# Patient Record
Sex: MALE | Race: WHITE
[De-identification: names, ages, dates, MRNs, and addresses within clinical notes are randomized per-mention and may not be internally consistent; named-entity substitution may affect disease eponyms.]

---

## 2019-02-12 ENCOUNTER — OTHER (OUTPATIENT)
Age: 24
End: 2019-02-12

## 2019-02-14 ENCOUNTER — APPOINTMENT (OUTPATIENT)
Dept: INTERNAL MEDICINE | Facility: CLINIC | Age: 24
End: 2019-02-14
Payer: COMMERCIAL

## 2019-02-14 ENCOUNTER — LABORATORY RESULT (OUTPATIENT)
Age: 24
End: 2019-02-14

## 2019-02-14 VITALS
HEIGHT: 63 IN | WEIGHT: 158 LBS | SYSTOLIC BLOOD PRESSURE: 132 MMHG | BODY MASS INDEX: 28 KG/M2 | OXYGEN SATURATION: 98 % | HEART RATE: 102 BPM | DIASTOLIC BLOOD PRESSURE: 70 MMHG

## 2019-02-14 DIAGNOSIS — Z83.3 FAMILY HISTORY OF DIABETES MELLITUS: ICD-10-CM

## 2019-02-14 DIAGNOSIS — G43.909 MIGRAINE, UNSPECIFIED, NOT INTRACTABLE, W/OUT STATUS MIGRAINOSUS: ICD-10-CM

## 2019-02-14 PROCEDURE — G0442 ANNUAL ALCOHOL SCREEN 15 MIN: CPT

## 2019-02-14 PROCEDURE — 99385 PREV VISIT NEW AGE 18-39: CPT | Mod: 25

## 2019-02-14 PROCEDURE — G0444 DEPRESSION SCREEN ANNUAL: CPT

## 2019-02-18 PROBLEM — Z83.3 FAMILY HISTORY OF DIABETES MELLITUS: Status: ACTIVE | Noted: 2019-02-18

## 2019-02-18 PROBLEM — G43.909 MIGRAINE HEADACHE: Status: ACTIVE | Noted: 2019-02-18

## 2019-02-18 RX ORDER — LEUPROLIDE ACETATE 11.25 MG
KIT INTRAMUSCULAR
Refills: 0 | Status: ACTIVE | COMMUNITY

## 2019-02-18 RX ORDER — TESTOSTERONE CYPIONATE 100 MG/ML
100 INJECTION, SOLUTION INTRAMUSCULAR
Refills: 0 | Status: ACTIVE | COMMUNITY

## 2019-02-18 NOTE — ASSESSMENT
[FreeTextEntry1] : 1.  [facility] - reports Tdap is UTD.  Had a flu shot last month.  No menses since he started the testosterone.  Reminded that he will need to follow acceptible screening intervals.\par 2.  FTM - referred to Endocrine for HRT.\par 3.  Labs as per plan, including STI's.\par 4.  Elevated LFT's - reports that he has not had them checked in 2 years.  Will check today along with hepatitis serologies.\par 5.  Further management pending above.

## 2019-02-18 NOTE — HISTORY OF PRESENT ILLNESS
[de-identified] : The patient is a 25 yo MTF trasngender male who presents to the office today to establish care with a PCP and to continue with his HRT through endocrinology.  He is currently on testosterone injections and lupron.  He came out at the age of 20 and started testosterone in 9/17.  He underwent top surgery in 9/18 and he is pleased with the outcome.  He reports good ROM..  He has a good support system at home and they have been accepting.  He has an upcoming appointment with [facility].  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  He tells me that he has a h/o elevated LFT's that was probably related to the use of tylenoll #3 a number of years in the past.  he was followed by an MD for this and tells me that the LFT's have trended down since he stopped taking the tylenol in 2015.

## 2019-02-20 ENCOUNTER — APPOINTMENT (OUTPATIENT)
Dept: PEDIATRIC ALLERGY IMMUNOLOGY | Facility: CLINIC | Age: 24
End: 2019-02-20

## 2019-02-22 ENCOUNTER — OTHER (OUTPATIENT)
Age: 24
End: 2019-02-22

## 2019-02-25 ENCOUNTER — OTHER (OUTPATIENT)
Age: 24
End: 2019-02-25

## 2019-02-26 ENCOUNTER — OTHER (OUTPATIENT)
Age: 24
End: 2019-02-26

## 2019-03-21 ENCOUNTER — OTHER (OUTPATIENT)
Age: 24
End: 2019-03-21

## 2019-03-22 ENCOUNTER — APPOINTMENT (OUTPATIENT)
Dept: PEDIATRIC ALLERGY IMMUNOLOGY | Facility: CLINIC | Age: 24
End: 2019-03-22
Payer: COMMERCIAL

## 2019-03-22 ENCOUNTER — OUTPATIENT (OUTPATIENT)
Dept: OUTPATIENT SERVICES | Facility: HOSPITAL | Age: 24
LOS: 1 days | End: 2019-03-22
Payer: COMMERCIAL

## 2019-03-22 ENCOUNTER — OTHER (OUTPATIENT)
Age: 24
End: 2019-03-22

## 2019-03-22 VITALS
HEIGHT: 63 IN | SYSTOLIC BLOOD PRESSURE: 121 MMHG | WEIGHT: 155 LBS | BODY MASS INDEX: 27.46 KG/M2 | OXYGEN SATURATION: 98 % | HEART RATE: 71 BPM | DIASTOLIC BLOOD PRESSURE: 80 MMHG

## 2019-03-22 DIAGNOSIS — Z00.00 ENCOUNTER FOR GENERAL ADULT MEDICAL EXAMINATION W/OUT ABNORMAL FINDINGS: ICD-10-CM

## 2019-03-22 PROCEDURE — 99204 OFFICE O/P NEW MOD 45 MIN: CPT

## 2019-03-22 PROCEDURE — G0463: CPT

## 2019-03-22 NOTE — SOCIAL HISTORY
[Sexually Active] : The patient is sexually active [Monogamous] : monogamous [Vaginal] : vaginal [Oral-Receptive (pt. mouth)] : oral-receptive (pt. mouth) [To Pt Genitalia] : pt genitalia [Both ___] : [unfilled] male and female [Partnership for Health – Safe Sex Evidence Based Intervention] : The Partnership for Health Safe Sex Evidence Based Intervention was applied [Positive Reinforcement of Safe Behavior Message] : and a positive reinforcement of safe behavior message was provided.

## 2019-03-22 NOTE — HISTORY OF PRESENT ILLNESS
[FreeTextEntry1] : Call PT: Boy\par \par Sexual orientation: Garcia\par  \par Reason for engagement: PT is a 24 year old assigned Female at birth, ID Male, He/Him pronouns coming in for an intake visit at [facility]. \par  \par Concerns: PT is concerned that he is low on his testosterone prescription. \par  \par Goals: \par •PT would like to transfer endocrinology care. PT currently goes to [facility].	 \par •PT would also like to connect with a therapist to cope with anxiety.	 	 \par  \par Transition HX & Present Life: PT reports always knowing "something was off" from a very young age. PT said when he was 11 and 12 he went on YouTube and watched transition videos. PT reports showing boyfriend the transition videos at the time, and thinking that although he was interested in transitioning, it would never be something he could do. PT reports mid-2017 he "couldn't take it anymore" and came out to family and friends. By September 2017, PT started hormones, changed name, and got top-surgery. 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 PT served for a few months. PT states that his anxiety triggers depression at times. PT is interested in connecting with a mental health provider to address issues with anxiety. No SI/HI currently. \par \par Endocrinology: PT's previous endocrinologist was concerned that PT may be insensitive to androgens. Before going to [facility] and seeing Geovani Faulkner, patient went to [facility]. PT felt uncomfortable there and he wanted to see a specialist. PT would like to be tested and monitored with an endocrinologist.\par  \par Medications: PT takes Testosterone injection (0.3 mL weekly)\par  \par Causes of Gender Dysphoria/Body Dysmorphia: PT has gender dysphoria about thighs, but states that it is not "crippling anymore". PT said that since top-surgery, he has been feeling a lot better and less dysphoric about his body. PT is also frustrated and dysphoric about lack of leg hair. \par  \par Coping Strategies: PT says that when he feels dysphoric he likes to look at pictures of himself from years ago before he began Testosterone and got surgery and it makes him feel better. PT likes to make comparison photos. PT also talks to boyfriend who is a transmale. \par  \par Appearance: PT presents as male with short hair cut and facial hair. PT has had top surgery and no longer binds. PT cut hair short in 2016.\par  \par Tattoos/Piercings: PT has 13 tattoos all done professionally with sterile technique. PT does not have any piercings.\par  \par Cigarette and/or marijuana use? PT smokes cigarettes occasionally in social settings. Less than monthly. \par Alcohol use? PT drinks very occasionally and has not drank in a year.\par Drug use? PT denies any drug use.\par  \par Primary Care Provider: Dr. Elida Bender.\par  \par OB/GYN: Patient reports he has been avoiding going to a gynecologist. \par  \par Reproductive Endocrinology: PT did not pursue egg freezing and has no interest in having biological kids.\par  \par Gender-Affirming Surgery: PT got top-surgery from Dr. Lindsey Alfonso in September 2018 ([facility]). PT is very happy with the results.\par  \par Name Change/Gender Marker: PT changed name and gender marker in November 2017. PT was born in [place] and has had a hard time changing birth certificate. PT has not been successful in changing birth certificate. \par  \par Nutrition: PT is not interested in seeing nutritionist.\par  \par Sexual Health: PT is sexually active in a monogamous relationship of 12 years. PT engages in kissing, touching, oral sex, and vaginal sex with transmale partner. \par \par \par

## 2019-03-22 NOTE — PHYSICAL EXAM
[Alert] : alert [Well Nourished] : well nourished [Healthy Appearance] : healthy appearance [No Acute Distress] : no acute distress [Well Developed] : well developed [Normal Pupil & Iris Size/Symmetry] : normal pupil and iris size and symmetry [No Discharge] : no discharge [No Photophobia] : no photophobia [Sclera Not Icteric] : sclera not icteric [Normal TMs] : both tympanic membranes were normal [Normal Nasal Mucosa] : the nasal mucosa was normal [Normal Lips/Tongue] : the lips and tongue were normal [Normal Outer Ear/Nose] : the ears and nose were normal in appearance [Normal Tonsils] : normal tonsils [No Thrush] : no thrush [Normal Dentition] : normal dentition [No Oral Lesions or Ulcers] : no oral lesions or ulcers [Supple] : the neck was supple [Normal Rate and Effort] : normal respiratory rhythm and effort [Normal Palpation] : palpation of the chest revealed no abnormalities [No Crackles] : no crackles [No Retractions] : no retractions [Bilateral Audible Breath Sounds] : bilateral audible breath sounds [Normal Rate] : heart rate was normal  [Normal S1, S2] : normal S1 and S2 [No murmur] : no murmur [Regular Rhythm] : with a regular rhythm [Soft] : abdomen soft [Not Tender] : non-tender [Not Distended] : not distended [No HSM] : no hepato-splenomegaly [Normal Cervical Lymph Nodes] : cervical [Skin Intact] : skin intact  [No Rash] : no rash [No Joint Swelling or Erythema] : no joint swelling or erythema [No clubbing] : no clubbing [No Edema] : no edema [No Cyanosis] : no cyanosis [Normal Mood] : mood was normal [Normal Affect] : affect was normal [Alert, Awake, Oriented as Age-Appropriate] : alert, awake, oriented as age appropriate [de-identified] : no palpable supraclavicular LAD.

## 2019-03-26 DIAGNOSIS — F64.0 TRANSSEXUALISM: ICD-10-CM

## 2019-03-26 DIAGNOSIS — Z00.00 ENCOUNTER FOR GENERAL ADULT MEDICAL EXAMINATION WITHOUT ABNORMAL FINDINGS: ICD-10-CM

## 2019-04-01 ENCOUNTER — OTHER (OUTPATIENT)
Age: 24
End: 2019-04-01

## 2019-04-07 LAB
25(OH)D3 SERPL-MCNC: 8.9 NG/ML
ALBUMIN SERPL ELPH-MCNC: 4.5 G/DL
ALP BLD-CCNC: 216 U/L
ALT SERPL-CCNC: 52 U/L
ANION GAP SERPL CALC-SCNC: 11 MMOL/L
AST SERPL-CCNC: 54 U/L
BASOPHILS # BLD AUTO: 0.01 K/UL
BASOPHILS NFR BLD AUTO: 0.2 %
BILIRUB SERPL-MCNC: 1.2 MG/DL
BUN SERPL-MCNC: 16 MG/DL
C TRACH RRNA SPEC QL NAA+PROBE: NOT DETECTED
CALCIUM SERPL-MCNC: 9.5 MG/DL
CHLORIDE SERPL-SCNC: 103 MMOL/L
CHOLEST SERPL-MCNC: 123 MG/DL
CHOLEST/HDLC SERPL: 1.5 RATIO
CO2 SERPL-SCNC: 29 MMOL/L
CREAT SERPL-MCNC: 0.68 MG/DL
EOSINOPHIL # BLD AUTO: 0.13 K/UL
EOSINOPHIL NFR BLD AUTO: 2.7 %
ESTRADIOL SERPL-MCNC: 19 PG/ML
FSH: 0.36 MIU/ML
GLUCOSE SERPL-MCNC: 84 MG/DL
HBA1C MFR BLD HPLC: 4.8 %
HBV SURFACE AB SER QL: REACTIVE
HBV SURFACE AG SER QL: NONREACTIVE
HCT VFR BLD CALC: 40.7 %
HCV AB SER QL: NONREACTIVE
HCV S/CO RATIO: 0.23 S/CO
HDLC SERPL-MCNC: 81 MG/DL
HEPATITIS A IGG ANTIBODY: REACTIVE
HGB BLD-MCNC: 12.9 G/DL
HIV1+2 AB SPEC QL IA.RAPID: NONREACTIVE
IMM GRANULOCYTES NFR BLD AUTO: 0.2 %
LDLC SERPL CALC-MCNC: 33 MG/DL
LH SERPL-ACNC: 1.5 IU/L
LYMPHOCYTES # BLD AUTO: 1.08 K/UL
LYMPHOCYTES NFR BLD AUTO: 22.8 %
MAN DIFF?: NORMAL
MCHC RBC-ENTMCNC: 28.3 PG
MCHC RBC-ENTMCNC: 31.7 GM/DL
MCV RBC AUTO: 89.3 FL
MONOCYTES # BLD AUTO: 0.45 K/UL
MONOCYTES NFR BLD AUTO: 9.5 %
N GONORRHOEA RRNA SPEC QL NAA+PROBE: NOT DETECTED
NEUTROPHILS # BLD AUTO: 3.05 K/UL
NEUTROPHILS NFR BLD AUTO: 64.6 %
PLATELET # BLD AUTO: 125 K/UL
POTASSIUM SERPL-SCNC: 4.1 MMOL/L
PROLACTIN SERPL-MCNC: 15.7 NG/ML
PROT SERPL-MCNC: 7.5 G/DL
RBC # BLD: 4.56 M/UL
RBC # FLD: 14.1 %
SODIUM SERPL-SCNC: 143 MMOL/L
SOURCE AMPLIFICATION: NORMAL
T PALLIDUM AB SER QL IA: NEGATIVE
T4 FREE SERPL-MCNC: 1.2 NG/DL
TESTOST SERPL-MCNC: >1500 NG/DL
TRIGL SERPL-MCNC: 46 MG/DL
TSH SERPL-ACNC: 1.7 UIU/ML
WBC # FLD AUTO: 4.73 K/UL

## 2019-04-10 ENCOUNTER — APPOINTMENT (OUTPATIENT)
Dept: PEDIATRIC ALLERGY IMMUNOLOGY | Facility: CLINIC | Age: 24
End: 2019-04-10

## 2019-04-17 ENCOUNTER — APPOINTMENT (OUTPATIENT)
Dept: PEDIATRIC ALLERGY IMMUNOLOGY | Facility: CLINIC | Age: 24
End: 2019-04-17

## 2019-04-24 ENCOUNTER — APPOINTMENT (OUTPATIENT)
Dept: PEDIATRIC ALLERGY IMMUNOLOGY | Facility: CLINIC | Age: 24
End: 2019-04-24

## 2019-04-26 ENCOUNTER — OTHER (OUTPATIENT)
Age: 24
End: 2019-04-26

## 2019-05-02 ENCOUNTER — APPOINTMENT (OUTPATIENT)
Age: 24
End: 2019-05-02
Payer: COMMERCIAL

## 2019-05-02 VITALS
WEIGHT: 155 LBS | SYSTOLIC BLOOD PRESSURE: 120 MMHG | HEIGHT: 63 IN | DIASTOLIC BLOOD PRESSURE: 78 MMHG | OXYGEN SATURATION: 98 % | HEART RATE: 75 BPM | BODY MASS INDEX: 27.46 KG/M2

## 2019-05-02 DIAGNOSIS — F64.0 TRANSSEXUALISM: ICD-10-CM

## 2019-05-02 DIAGNOSIS — R94.5 ABNORMAL RESULTS OF LIVER FUNCTION STUDIES: ICD-10-CM

## 2019-05-02 PROCEDURE — 99204 OFFICE O/P NEW MOD 45 MIN: CPT

## 2019-05-02 NOTE — ASSESSMENT
[FreeTextEntry1] : Patient is a 23 yo transman here for testosterone levels\par \par 1. Female to male transgender\par -patient was receiving hormone therapy at [facility] and is permanently moving to [place] tonight\par -he is here to get his blood levels checked\par -check testosterone levels and will discuss with the patient\par -he is not establishing care at this practice and can follow up with new endocrinologist in [place]par \par 2. Elevated liver function test\par -repeat lipid profile and CMP\par

## 2019-05-02 NOTE — REVIEW OF SYSTEMS
[Fatigue] : no fatigue [Decreased Appetite] : appetite not decreased [Visual Field Defect] : no visual field defect [Neck Pain] : no neck pain [Blurry Vision] : no blurred vision [Nasal Congestion] : no nasal congestion [Palpitations] : no palpitations [Chest Pain] : no chest pain [Heart Rate Is Slow] : the heart rate was not slow [Heart Rate Is Fast] : the heart rate was not fast [Vomiting] : no vomiting was observed [Nausea] : no nausea [Diarrhea] : no diarrhea [Constipation] : no constipation [Joint Pain] : no joint pain [Heat Intolerance] : heat tolerant [Joint Stiffness] : no joint stiffness [Cold Intolerance] : cold tolerant [Negative] : Eyes [All other systems negative] : All other systems negative

## 2019-05-02 NOTE — HISTORY OF PRESENT ILLNESS
[FreeTextEntry1] : Call PT: Boy\par Sexual orientation: Garcia\par \par Reason for engagement: Pt is a 24 year old assigned Female at birth, ID Male, He/Him pronouns coming in to have testosterone levels checked\par  \par Concerns: PT is concerned that he is low on his testosterone prescription. \par  	 \par Transition HX & Present Life: Pt reports always knowing "something was off" from a very young age. PT said when he was 11 and 12 he went on YouTube and watched transition videos. Mid-2017 he "couldn't take it anymore" and came out to family and friends. By September 2017, PT started hormones, changed name, and got top-surgery. 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 He is moving to [facility]\par  \par Endocrinology: Started gender affirming hormone therapy in 2017 and used to get care at [facility] Geovani Faulkner. But Dr. Faulkner moved to [facility] so patient is here for testosterone levels.  He is moving to [place], tonight and wants to just make sure his levels are good. He has yet to designate a care provider in [place] but has resources available to him\par Has had good response to T-body shape has changed, face changed, and more body hair\par  \par Medications: Current medicine is Testosterone injection IM (0.3 mL weekly).  Was on Lupron but stopped it three months ago.  \par  \par Causes of Gender Dysphoria/Body Dysmorphia: Voice dysphoria\par  \par Appearance: PT presents as male with short hair cut and facial hair. PT has had top surgery and no longer binds. PT cut hair short in 2016.\par  \par Tattoos/Piercings: PT has 13 tattoos all done professionally with sterile technique. No piercings\par  \par Cigarette and/or marijuana use: smokes cigarettes occasionally in social settings. \par Alcohol use: social alcohol\par Drug use: denies\par Primary Care Provider: Dr. Elida Bender.\par  \par OB/GYN: Patient reports he has been avoiding going to a gynecologist. \par  \par Reproductive Endocrinology: He did not pursue egg freezing and has no interest in having biological kids.\par  \par Gender-Affirming Surgery: PT got top-surgery from Dr. Lindsey Alfonso in September 2018 ([facility]). PT is very happy with the results.\par  \par Name Change/Gender Marker: PT changed name and gender marker in November 2017. PT was born in [place] and has had a hard time changing birth certificate. PT has not been successful in changing birth certificate. \par  \par Nutrition: PT is not interested in seeing nutritionist.\par  \par Sexual Health: PT is sexually active in a monogamous relationship of 12 years. PT engages in kissing, touching, oral sex, and vaginal sex with transmale partner. \par \par

## 2019-05-02 NOTE — PHYSICAL EXAM
[Alert] : alert [Well Developed] : well developed [Well Nourished] : well nourished [No Acute Distress] : no acute distress [Normal Hearing] : hearing was normal [EOMI] : extra ocular movement intact [Normal Rate and Effort] : normal respiratory rhythm and effort [No Respiratory Distress] : no respiratory distress [Thyroid Not Enlarged] : the thyroid was not enlarged [Clear to Auscultation] : lungs were clear to auscultation bilaterally [No Accessory Muscle Use] : no accessory muscle use [Normal Rate] : heart rate was normal  [Normal S1, S2] : normal S1 and S2 [Regular Rhythm] : with a regular rhythm [Normal Bowel Sounds] : normal bowel sounds [Not Tender] : non-tender [Soft] : abdomen soft [Normal Gait] : normal gait [No Motor Deficits] : the motor exam was normal [No Joint Swelling] : no joint swelling seen [No Rash] : no rash [No Tremors] : no tremors [Normal Insight/Judgement] : insight and judgment were intact [Normal Mood] : the mood was normal [Normal Affect] : the affect was normal

## 2019-05-10 LAB
ALBUMIN SERPL ELPH-MCNC: 5.1 G/DL
ALP BLD-CCNC: 206 U/L
ALT SERPL-CCNC: 60 U/L
ANION GAP SERPL CALC-SCNC: 13 MMOL/L
AST SERPL-CCNC: 76 U/L
BASOPHILS # BLD AUTO: 0.03 K/UL
BASOPHILS NFR BLD AUTO: 0.9 %
BILIRUB SERPL-MCNC: 1.7 MG/DL
BUN SERPL-MCNC: 17 MG/DL
CALCIUM SERPL-MCNC: 10.4 MG/DL
CHLORIDE SERPL-SCNC: 102 MMOL/L
CHOLEST SERPL-MCNC: 146 MG/DL
CHOLEST/HDLC SERPL: 1.6 RATIO
CO2 SERPL-SCNC: 27 MMOL/L
CREAT SERPL-MCNC: 0.65 MG/DL
EOSINOPHIL # BLD AUTO: 0 K/UL
EOSINOPHIL NFR BLD AUTO: 0 %
ESTIMATED AVERAGE GLUCOSE: 97 MG/DL
ESTRADIOL SERPL-MCNC: 15 PG/ML
FSH SERPL-MCNC: 0.4 IU/L
GLUCOSE SERPL-MCNC: 80 MG/DL
HBA1C MFR BLD HPLC: 5 %
HCT VFR BLD CALC: 43.6 %
HDLC SERPL-MCNC: 90 MG/DL
HGB BLD-MCNC: 14 G/DL
IMM GRANULOCYTES NFR BLD AUTO: 0 %
LDLC SERPL CALC-MCNC: 49 MG/DL
LYMPHOCYTES # BLD AUTO: 0.99 K/UL
LYMPHOCYTES NFR BLD AUTO: 28.6 %
MAN DIFF?: NORMAL
MCHC RBC-ENTMCNC: 28.7 PG
MCHC RBC-ENTMCNC: 32.1 GM/DL
MCV RBC AUTO: 89.3 FL
MONOCYTES # BLD AUTO: 0.25 K/UL
MONOCYTES NFR BLD AUTO: 7.2 %
NEUTROPHILS # BLD AUTO: 2.19 K/UL
NEUTROPHILS NFR BLD AUTO: 63.3 %
PLATELET # BLD AUTO: 100 K/UL
POTASSIUM SERPL-SCNC: 4.7 MMOL/L
PROT SERPL-MCNC: 7.8 G/DL
RBC # BLD: 4.88 M/UL
RBC # FLD: 14 %
SODIUM SERPL-SCNC: 142 MMOL/L
TESTOST SERPL-MCNC: 1260 NG/DL
TRIGL SERPL-MCNC: 33 MG/DL
TSH SERPL-ACNC: 2.41 UIU/ML
WBC # FLD AUTO: 3.46 K/UL

## 2019-09-05 ENCOUNTER — OTHER (OUTPATIENT)
Age: 24
End: 2019-09-05

## 2019-09-06 ENCOUNTER — OTHER (OUTPATIENT)
Age: 24
End: 2019-09-06